# Patient Record
Sex: MALE | Race: WHITE
[De-identification: names, ages, dates, MRNs, and addresses within clinical notes are randomized per-mention and may not be internally consistent; named-entity substitution may affect disease eponyms.]

---

## 2020-12-20 ENCOUNTER — HOSPITAL ENCOUNTER (EMERGENCY)
Dept: HOSPITAL 54 - ER | Age: 40
LOS: 1 days | Discharge: PSYCHIATRIC OR SUBSTANCE USE FACILITY | End: 2020-12-21
Payer: MEDICAID

## 2020-12-20 VITALS — WEIGHT: 130 LBS | HEIGHT: 65 IN | BODY MASS INDEX: 21.66 KG/M2

## 2020-12-20 DIAGNOSIS — Z20.828: ICD-10-CM

## 2020-12-20 DIAGNOSIS — R79.89: ICD-10-CM

## 2020-12-20 DIAGNOSIS — F17.200: ICD-10-CM

## 2020-12-20 DIAGNOSIS — F15.10: ICD-10-CM

## 2020-12-20 DIAGNOSIS — Z82.49: ICD-10-CM

## 2020-12-20 DIAGNOSIS — E86.0: ICD-10-CM

## 2020-12-20 DIAGNOSIS — D64.9: ICD-10-CM

## 2020-12-20 DIAGNOSIS — R45.851: Primary | ICD-10-CM

## 2020-12-20 DIAGNOSIS — G40.909: ICD-10-CM

## 2020-12-20 LAB
ALBUMIN SERPL BCP-MCNC: 3.7 G/DL (ref 3.4–5)
ALP SERPL-CCNC: 65 U/L (ref 46–116)
ALT SERPL W P-5'-P-CCNC: 88 U/L (ref 12–78)
APAP SERPL-MCNC: 0 UG/ML (ref 10–30)
AST SERPL W P-5'-P-CCNC: 541 U/L (ref 15–37)
BASOPHILS # BLD AUTO: 0.1 /CMM (ref 0–0.2)
BASOPHILS NFR BLD AUTO: 0.9 % (ref 0–2)
BILIRUB DIRECT SERPL-MCNC: 0.3 MG/DL (ref 0–0.2)
BILIRUB SERPL-MCNC: 1.4 MG/DL (ref 0.2–1)
BILIRUB UR QL STRIP: NEGATIVE
BUN SERPL-MCNC: 30 MG/DL (ref 7–18)
CALCIUM SERPL-MCNC: 8.6 MG/DL (ref 8.5–10.1)
CHLORIDE SERPL-SCNC: 102 MMOL/L (ref 98–107)
CO2 SERPL-SCNC: 27 MMOL/L (ref 21–32)
COLOR UR: YELLOW
CREAT SERPL-MCNC: 0.8 MG/DL (ref 0.6–1.3)
DEPRECATED SQUAMOUS URNS QL MICRO: (no result) /HPF
EOSINOPHIL NFR BLD AUTO: 1.6 % (ref 0–6)
ETHANOL SERPL-MCNC: < 3 MG/DL (ref 0–0)
GLUCOSE SERPL-MCNC: 101 MG/DL (ref 74–106)
GLUCOSE UR STRIP-MCNC: NEGATIVE MG/DL
HCT VFR BLD AUTO: 40 % (ref 39–51)
HGB BLD-MCNC: 13.3 G/DL (ref 13.5–17.5)
LEUKOCYTE ESTERASE UR QL STRIP: NEGATIVE
LYMPHOCYTES NFR BLD AUTO: 3.7 /CMM (ref 0.8–4.8)
LYMPHOCYTES NFR BLD AUTO: 31.8 % (ref 20–44)
MCHC RBC AUTO-ENTMCNC: 33 G/DL (ref 31–36)
MCV RBC AUTO: 89 FL (ref 80–96)
MONOCYTES NFR BLD AUTO: 1.3 /CMM (ref 0.1–1.3)
MONOCYTES NFR BLD AUTO: 11.4 % (ref 2–12)
NEUTROPHILS # BLD AUTO: 6.2 /CMM (ref 1.8–8.9)
NEUTROPHILS NFR BLD AUTO: 54.3 % (ref 43–81)
NITRITE UR QL STRIP: NEGATIVE
PH UR STRIP: 6.5 [PH] (ref 5–8)
PLATELET # BLD AUTO: 461 /CMM (ref 150–450)
POTASSIUM SERPL-SCNC: 3.9 MMOL/L (ref 3.5–5.1)
PROT SERPL-MCNC: 7.5 G/DL (ref 6.4–8.2)
PROT UR QL STRIP: 30 MG/DL
RBC # BLD AUTO: 4.47 MIL/UL (ref 4.5–6)
RBC #/AREA URNS HPF: (no result) /HPF (ref 0–2)
SODIUM SERPL-SCNC: 138 MMOL/L (ref 136–145)
UROBILINOGEN UR STRIP-MCNC: >=8 EU/DL
WBC #/AREA URNS HPF: (no result) /HPF (ref 0–3)
WBC NRBC COR # BLD AUTO: 11.5 K/UL (ref 4.3–11)

## 2020-12-20 PROCEDURE — 96360 HYDRATION IV INFUSION INIT: CPT

## 2020-12-20 PROCEDURE — 80076 HEPATIC FUNCTION PANEL: CPT

## 2020-12-20 PROCEDURE — 85025 COMPLETE CBC W/AUTO DIFF WBC: CPT

## 2020-12-20 PROCEDURE — 81001 URINALYSIS AUTO W/SCOPE: CPT

## 2020-12-20 PROCEDURE — G0480 DRUG TEST DEF 1-7 CLASSES: HCPCS

## 2020-12-20 PROCEDURE — C9803 HOPD COVID-19 SPEC COLLECT: HCPCS

## 2020-12-20 PROCEDURE — 80320 DRUG SCREEN QUANTALCOHOLS: CPT

## 2020-12-20 PROCEDURE — 36415 COLL VENOUS BLD VENIPUNCTURE: CPT

## 2020-12-20 PROCEDURE — 80048 BASIC METABOLIC PNL TOTAL CA: CPT

## 2020-12-20 PROCEDURE — 87426 SARSCOV CORONAVIRUS AG IA: CPT

## 2020-12-20 PROCEDURE — 80307 DRUG TEST PRSMV CHEM ANLYZR: CPT

## 2020-12-20 PROCEDURE — 99285 EMERGENCY DEPT VISIT HI MDM: CPT

## 2020-12-20 PROCEDURE — 80299 QUANTITATIVE ASSAY DRUG: CPT

## 2020-12-20 NOTE — NUR
BIBSELF C/O SUICIDAL IDEATION WITH PLAN TO JUMP OFF BUILDING; PT AAOX4, -SOB, 
NAD NOTED, VSS, PENDING ER PROVIDER EVAL

## 2020-12-21 VITALS — DIASTOLIC BLOOD PRESSURE: 82 MMHG | SYSTOLIC BLOOD PRESSURE: 119 MMHG

## 2020-12-21 NOTE — NUR
TRANSFER INFORMATION: PT ACCEPTED TO [facility]

ACCEPTING MD: DR. COE

UNIT 2

NUMBER FOR REPORT: [phone]

## 2020-12-21 NOTE — NUR
CALLED [facility] FOR TRANSPORTATION. ETA [id number], DID NOT SET UP 
TRANSPORTATION